# Patient Record
Sex: MALE | Race: WHITE | NOT HISPANIC OR LATINO | Employment: FULL TIME | URBAN - METROPOLITAN AREA
[De-identification: names, ages, dates, MRNs, and addresses within clinical notes are randomized per-mention and may not be internally consistent; named-entity substitution may affect disease eponyms.]

---

## 2023-12-11 ENCOUNTER — APPOINTMENT (OUTPATIENT)
Dept: PRIMARY CARE | Facility: CLINIC | Age: 38
End: 2023-12-11
Payer: COMMERCIAL

## 2023-12-14 ENCOUNTER — APPOINTMENT (OUTPATIENT)
Dept: PRIMARY CARE | Facility: CLINIC | Age: 38
End: 2023-12-14
Payer: COMMERCIAL

## 2023-12-20 ENCOUNTER — APPOINTMENT (OUTPATIENT)
Dept: PRIMARY CARE | Facility: CLINIC | Age: 38
End: 2023-12-20
Payer: COMMERCIAL

## 2024-01-05 ENCOUNTER — OFFICE VISIT (OUTPATIENT)
Dept: PRIMARY CARE | Facility: CLINIC | Age: 39
End: 2024-01-05
Payer: COMMERCIAL

## 2024-01-05 ENCOUNTER — ANCILLARY PROCEDURE (OUTPATIENT)
Dept: RADIOLOGY | Facility: CLINIC | Age: 39
End: 2024-01-05
Payer: COMMERCIAL

## 2024-01-05 VITALS
HEART RATE: 77 BPM | SYSTOLIC BLOOD PRESSURE: 131 MMHG | OXYGEN SATURATION: 98 % | HEIGHT: 72 IN | BODY MASS INDEX: 32.37 KG/M2 | RESPIRATION RATE: 18 BRPM | WEIGHT: 239 LBS | TEMPERATURE: 97.6 F | DIASTOLIC BLOOD PRESSURE: 87 MMHG

## 2024-01-05 DIAGNOSIS — Z23 NEED FOR VACCINATION: ICD-10-CM

## 2024-01-05 DIAGNOSIS — Z13.220 LIPID SCREENING: ICD-10-CM

## 2024-01-05 DIAGNOSIS — Z13.0 SCREENING FOR DEFICIENCY ANEMIA: ICD-10-CM

## 2024-01-05 DIAGNOSIS — L30.8 ANNULAR DERMATITIS: ICD-10-CM

## 2024-01-05 DIAGNOSIS — E55.9 VITAMIN D DEFICIENCY: ICD-10-CM

## 2024-01-05 DIAGNOSIS — M95.4 ACQUIRED PECTUS CARINATUM: Primary | ICD-10-CM

## 2024-01-05 DIAGNOSIS — E53.8 B12 DEFICIENCY: ICD-10-CM

## 2024-01-05 DIAGNOSIS — I10 HYPERTENSION, UNSPECIFIED TYPE: ICD-10-CM

## 2024-01-05 DIAGNOSIS — M95.4 ACQUIRED PECTUS CARINATUM: ICD-10-CM

## 2024-01-05 DIAGNOSIS — S91.209A AVULSION OF TOENAIL, INITIAL ENCOUNTER: ICD-10-CM

## 2024-01-05 DIAGNOSIS — Z13.29 THYROID DISORDER SCREEN: ICD-10-CM

## 2024-01-05 PROBLEM — M51.369 DEGENERATION OF INTERVERTEBRAL DISC OF LUMBAR REGION: Status: ACTIVE | Noted: 2024-01-05

## 2024-01-05 PROBLEM — M51.27 HERNIATED NUCLEUS PULPOSUS OF LUMBOSACRAL REGION: Status: ACTIVE | Noted: 2024-01-05

## 2024-01-05 PROBLEM — M47.816 LUMBAR SPONDYLOSIS: Status: ACTIVE | Noted: 2024-01-05

## 2024-01-05 PROBLEM — M54.50 LOWER BACK PAIN: Status: ACTIVE | Noted: 2024-01-05

## 2024-01-05 PROBLEM — M51.36 DEGENERATION OF INTERVERTEBRAL DISC OF LUMBAR REGION: Status: ACTIVE | Noted: 2024-01-05

## 2024-01-05 PROBLEM — M54.16 LUMBAR RADICULOPATHY: Status: ACTIVE | Noted: 2024-01-05

## 2024-01-05 PROBLEM — M48.07 SPINAL STENOSIS OF LUMBOSACRAL REGION: Status: ACTIVE | Noted: 2024-01-05

## 2024-01-05 PROCEDURE — 71046 X-RAY EXAM CHEST 2 VIEWS: CPT | Performed by: RADIOLOGY

## 2024-01-05 PROCEDURE — 3074F SYST BP LT 130 MM HG: CPT | Performed by: NURSE PRACTITIONER

## 2024-01-05 PROCEDURE — 99204 OFFICE O/P NEW MOD 45 MIN: CPT | Performed by: NURSE PRACTITIONER

## 2024-01-05 PROCEDURE — 3079F DIAST BP 80-89 MM HG: CPT | Performed by: NURSE PRACTITIONER

## 2024-01-05 PROCEDURE — 71046 X-RAY EXAM CHEST 2 VIEWS: CPT

## 2024-01-05 PROCEDURE — 90471 IMMUNIZATION ADMIN: CPT | Performed by: NURSE PRACTITIONER

## 2024-01-05 PROCEDURE — 90686 IIV4 VACC NO PRSV 0.5 ML IM: CPT | Performed by: NURSE PRACTITIONER

## 2024-01-05 ASSESSMENT — ENCOUNTER SYMPTOMS
DIARRHEA: 0
CONSTIPATION: 0
FEVER: 0
PALPITATIONS: 0
SHORTNESS OF BREATH: 0
NERVOUS/ANXIOUS: 0
HEADACHES: 0
CHILLS: 0
NAUSEA: 0
COUGH: 1
CONSTITUTIONAL NEGATIVE: 1
APNEA: 0
WEAKNESS: 0
WHEEZING: 0
ABDOMINAL PAIN: 0
ACTIVITY CHANGE: 0
NUMBNESS: 1
DIFFICULTY URINATING: 0
HEMATURIA: 0
CONFUSION: 0
VOMITING: 0
FATIGUE: 0
DIZZINESS: 0

## 2024-01-05 ASSESSMENT — PAIN SCALES - GENERAL: PAINLEVEL: 8

## 2024-01-05 NOTE — ASSESSMENT & PLAN NOTE
Slightly elevated   Check chem panel   Follow up 2 weeks   Check daily /call for consistently elevated bp >140/90

## 2024-01-05 NOTE — PROGRESS NOTES
Subjective   Patient ID: Isaías Bowser is a 38 y.o. male who presents for New Patient Visit, Arm Pain (Repost sharp pain in left elbow for the past few months ), Rash (On left leg, present for a few months ), and Numbness (Reports numbness in right great toe past few months).    New patient visit to establish care.  Patient with no major past medical history.  No medications  Complaining of left lower extremity rash.  Rash has improved but has noticed since 8/2023.  He describes it as a circular appearing rash with a center dot that was red.  Denies body aches, fatigue.  No awareness of a tick bite  Right big toe numbness and tingling. Dropped a bar on it awhile back, nail fell off and grew back. No pain, no swelling.   Patient reports that his sternum area seems enlarged.  Only has noticed in the past year.  No pain    Arm Pain   Associated symptoms include numbness. Pertinent negatives include no chest pain.   Rash  Associated symptoms include coughing. Pertinent negatives include no diarrhea, fatigue, fever, shortness of breath or vomiting.   Neuropathy           Review of Systems   Constitutional: Negative.  Negative for activity change, chills, fatigue and fever.   Respiratory:  Positive for cough. Negative for apnea, shortness of breath and wheezing.    Cardiovascular:  Negative for chest pain and palpitations.   Gastrointestinal:  Negative for abdominal pain, constipation, diarrhea, nausea and vomiting.   Genitourinary:  Negative for difficulty urinating, hematuria and urgency.   Skin:  Positive for rash.        Left leg  but improved   8/2023   Itching. Circular with dot in center.    Neurological:  Positive for numbness. Negative for dizziness, weakness and headaches.        Right big toe   No swelling or pain   Psychiatric/Behavioral:  Negative for confusion, self-injury and suicidal ideas. The patient is not nervous/anxious.        Objective   /87   Pulse 77   Temp 36.4 °C (97.6 °F) (Temporal)    Resp 18   Ht 1.829 m (6')   Wt 108 kg (239 lb)   SpO2 98%   BMI 32.41 kg/m²     Physical Exam  Vitals reviewed.   Constitutional:       Appearance: Normal appearance.   HENT:      Head: Normocephalic.      Right Ear: Tympanic membrane normal.      Left Ear: Tympanic membrane normal.      Nose: Nose normal.   Eyes:      Pupils: Pupils are equal, round, and reactive to light.   Cardiovascular:      Rate and Rhythm: Normal rate and regular rhythm.      Pulses: Normal pulses.      Heart sounds: Normal heart sounds.   Pulmonary:      Effort: Pulmonary effort is normal.      Breath sounds: Normal breath sounds.   Abdominal:      General: Bowel sounds are normal.   Musculoskeletal:         General: Normal range of motion.   Neurological:      Mental Status: He is alert and oriented to person, place, and time.   Psychiatric:         Mood and Affect: Mood normal.         Behavior: Behavior normal.         Assessment/Plan   Problem List Items Addressed This Visit             ICD-10-CM    Acquired pectus carinatum - Primary M95.4     Chest x-ray for eval         Relevant Orders    XR chest 2 views    Need for vaccination Z23     Influenza vaccination today         Relevant Orders    Flu vaccine (IIV4) age 6 months and greater, preservative free (Completed)    Toenail avulsion S91.209A    Relevant Orders    Referral to Podiatry    Hypertension I10     Slightly elevated   Check chem panel   Follow up 2 weeks   Check daily /call for consistently elevated bp >140/90            Relevant Orders    Comprehensive Metabolic Panel    TSH with reflex to Free T4 if abnormal    Screening for deficiency anemia Z13.0    Relevant Orders    CBC    Iron and TIBC    Ferritin    Annular dermatitis L30.8     Lyme panel   No known tick bite  No n/v, fatigue, body ache.          Relevant Orders    Lyme disease, PCR     Other Visit Diagnoses         Codes    B12 deficiency     E53.8    Relevant Orders    Vitamin B12    Vitamin D deficiency      E55.9    Relevant Orders    Vitamin D 1,25 Dihydroxy (for eval of hypercalcemia)    Lipid screening     Z13.220    Relevant Orders    Lipid Panel    Thyroid disorder screen     Z13.29    Relevant Orders    TSH with reflex to Free T4 if abnormal            Time Spent  Time spent directly with patient, family or caregiver: 20 minutes  Documentation Time: 10 minutes

## 2024-01-06 ENCOUNTER — LAB (OUTPATIENT)
Dept: LAB | Facility: LAB | Age: 39
End: 2024-01-06
Payer: COMMERCIAL

## 2024-01-06 DIAGNOSIS — E55.9 VITAMIN D DEFICIENCY: ICD-10-CM

## 2024-01-06 DIAGNOSIS — Z13.220 LIPID SCREENING: ICD-10-CM

## 2024-01-06 DIAGNOSIS — Z13.0 SCREENING FOR DEFICIENCY ANEMIA: ICD-10-CM

## 2024-01-06 DIAGNOSIS — E53.8 B12 DEFICIENCY: ICD-10-CM

## 2024-01-06 DIAGNOSIS — Z13.29 THYROID DISORDER SCREEN: ICD-10-CM

## 2024-01-06 DIAGNOSIS — L30.8 ANNULAR DERMATITIS: ICD-10-CM

## 2024-01-06 DIAGNOSIS — I10 HYPERTENSION, UNSPECIFIED TYPE: ICD-10-CM

## 2024-01-06 LAB
ALBUMIN SERPL BCP-MCNC: 4.5 G/DL (ref 3.4–5)
ALP SERPL-CCNC: 73 U/L (ref 33–120)
ALT SERPL W P-5'-P-CCNC: 17 U/L (ref 10–52)
ANION GAP SERPL CALC-SCNC: 10 MMOL/L (ref 10–20)
AST SERPL W P-5'-P-CCNC: 17 U/L (ref 9–39)
BILIRUB SERPL-MCNC: 0.8 MG/DL (ref 0–1.2)
BUN SERPL-MCNC: 13 MG/DL (ref 6–23)
CALCIUM SERPL-MCNC: 9.2 MG/DL (ref 8.6–10.3)
CHLORIDE SERPL-SCNC: 105 MMOL/L (ref 98–107)
CHOLEST SERPL-MCNC: 139 MG/DL (ref 0–199)
CHOLESTEROL/HDL RATIO: 3.6
CO2 SERPL-SCNC: 29 MMOL/L (ref 21–32)
CREAT SERPL-MCNC: 0.91 MG/DL (ref 0.5–1.3)
ERYTHROCYTE [DISTWIDTH] IN BLOOD BY AUTOMATED COUNT: 12.7 % (ref 11.5–14.5)
FERRITIN SERPL-MCNC: 161 NG/ML (ref 20–300)
GFR SERPL CREATININE-BSD FRML MDRD: >90 ML/MIN/1.73M*2
GLUCOSE SERPL-MCNC: 92 MG/DL (ref 74–99)
HCT VFR BLD AUTO: 48.1 % (ref 41–52)
HDLC SERPL-MCNC: 38.9 MG/DL
HGB BLD-MCNC: 15.8 G/DL (ref 13.5–17.5)
IRON SATN MFR SERPL: 13 % (ref 25–45)
IRON SERPL-MCNC: 43 UG/DL (ref 35–150)
LDLC SERPL CALC-MCNC: 83 MG/DL
MCH RBC QN AUTO: 31.3 PG (ref 26–34)
MCHC RBC AUTO-ENTMCNC: 32.8 G/DL (ref 32–36)
MCV RBC AUTO: 95 FL (ref 80–100)
NON HDL CHOLESTEROL: 100 MG/DL (ref 0–149)
NRBC BLD-RTO: 0 /100 WBCS (ref 0–0)
PLATELET # BLD AUTO: 310 X10*3/UL (ref 150–450)
POTASSIUM SERPL-SCNC: 4.8 MMOL/L (ref 3.5–5.3)
PROT SERPL-MCNC: 7.1 G/DL (ref 6.4–8.2)
RBC # BLD AUTO: 5.04 X10*6/UL (ref 4.5–5.9)
SODIUM SERPL-SCNC: 139 MMOL/L (ref 136–145)
TIBC SERPL-MCNC: 328 UG/DL (ref 240–445)
TRIGL SERPL-MCNC: 87 MG/DL (ref 0–149)
TSH SERPL-ACNC: 0.86 MIU/L (ref 0.44–3.98)
UIBC SERPL-MCNC: 285 UG/DL (ref 110–370)
VIT B12 SERPL-MCNC: 720 PG/ML (ref 211–911)
VLDL: 17 MG/DL (ref 0–40)
WBC # BLD AUTO: 9.9 X10*3/UL (ref 4.4–11.3)

## 2024-01-06 PROCEDURE — 85027 COMPLETE CBC AUTOMATED: CPT

## 2024-01-06 PROCEDURE — 80053 COMPREHEN METABOLIC PANEL: CPT

## 2024-01-06 PROCEDURE — 84443 ASSAY THYROID STIM HORMONE: CPT

## 2024-01-06 PROCEDURE — 82652 VIT D 1 25-DIHYDROXY: CPT

## 2024-01-06 PROCEDURE — 36415 COLL VENOUS BLD VENIPUNCTURE: CPT

## 2024-01-06 PROCEDURE — 80061 LIPID PANEL: CPT

## 2024-01-06 PROCEDURE — 83550 IRON BINDING TEST: CPT

## 2024-01-06 PROCEDURE — 82607 VITAMIN B-12: CPT

## 2024-01-06 PROCEDURE — 82728 ASSAY OF FERRITIN: CPT

## 2024-01-06 PROCEDURE — 87476 LYME DIS DNA AMP PROBE: CPT

## 2024-01-06 PROCEDURE — 83540 ASSAY OF IRON: CPT

## 2024-01-09 LAB — 1,25(OH)2D3 SERPL-MCNC: 41.6 PG/ML (ref 19.9–79.3)

## 2024-01-10 LAB
B BURGDOR DNA SPEC QL NAA+PROBE: NOT DETECTED
SPECIMEN SOURCE: NORMAL

## 2024-01-11 ENCOUNTER — TELEPHONE (OUTPATIENT)
Dept: PRIMARY CARE | Facility: CLINIC | Age: 39
End: 2024-01-11
Payer: COMMERCIAL

## 2024-01-11 DIAGNOSIS — F17.200 SMOKER: Primary | ICD-10-CM

## 2024-01-11 RX ORDER — NICOTINE 21-14-7MG
KIT TRANSDERMAL
Qty: 1 KIT | Refills: 0 | Status: SHIPPED | OUTPATIENT
Start: 2024-01-11

## 2024-01-11 NOTE — TELEPHONE ENCOUNTER
Patient called in regarding his previous visit. He said you two had discussed putting him on nicotine patches to assist in quitting smoking. I am not seeing that in the visit notes, but he is requesting to be put on these and have them sent to his preferred pharmacy. Please advise.

## 2024-01-11 NOTE — TELEPHONE ENCOUNTER
Ordered nicotine patch kit.  Can you please call the pharmacy and check with them to make sure this kit is ordered correctly.  I have never ordered the kit before and usually order this 21, 14 and 7 mg separately.  I did not want patient to have any issues once they arrive to pick this up

## 2024-01-19 ENCOUNTER — OFFICE VISIT (OUTPATIENT)
Dept: PRIMARY CARE | Facility: CLINIC | Age: 39
End: 2024-01-19
Payer: COMMERCIAL

## 2024-01-19 ENCOUNTER — TELEPHONE (OUTPATIENT)
Dept: PRIMARY CARE | Facility: CLINIC | Age: 39
End: 2024-01-19

## 2024-01-19 VITALS
WEIGHT: 235.4 LBS | BODY MASS INDEX: 31.89 KG/M2 | HEART RATE: 80 BPM | RESPIRATION RATE: 18 BRPM | DIASTOLIC BLOOD PRESSURE: 83 MMHG | SYSTOLIC BLOOD PRESSURE: 127 MMHG | OXYGEN SATURATION: 97 % | TEMPERATURE: 97.5 F | HEIGHT: 72 IN

## 2024-01-19 DIAGNOSIS — R53.83 FATIGUE, UNSPECIFIED TYPE: ICD-10-CM

## 2024-01-19 DIAGNOSIS — L30.8 ANNULAR DERMATITIS: ICD-10-CM

## 2024-01-19 DIAGNOSIS — I10 HYPERTENSION, UNSPECIFIED TYPE: ICD-10-CM

## 2024-01-19 DIAGNOSIS — R06.83 SNORING: Primary | ICD-10-CM

## 2024-01-19 PROCEDURE — 3074F SYST BP LT 130 MM HG: CPT | Performed by: NURSE PRACTITIONER

## 2024-01-19 PROCEDURE — 99214 OFFICE O/P EST MOD 30 MIN: CPT | Performed by: NURSE PRACTITIONER

## 2024-01-19 PROCEDURE — 3079F DIAST BP 80-89 MM HG: CPT | Performed by: NURSE PRACTITIONER

## 2024-01-19 RX ORDER — PHENYLEPHRINE HCL 1 %
500 DROPS NASAL EVERY 6 HOURS PRN
COMMUNITY
Start: 2024-01-11

## 2024-01-19 RX ORDER — CHLORHEXIDINE GLUCONATE ORAL RINSE 1.2 MG/ML
SOLUTION DENTAL
COMMUNITY
Start: 2024-01-11

## 2024-01-19 RX ORDER — IBUPROFEN 600 MG/1
600 TABLET ORAL EVERY 6 HOURS PRN
COMMUNITY
Start: 2024-01-11

## 2024-01-19 ASSESSMENT — ENCOUNTER SYMPTOMS
FATIGUE: 1
FEVER: 0
ABDOMINAL PAIN: 0
CONFUSION: 0
NAUSEA: 0
PALPITATIONS: 0
RESPIRATORY NEGATIVE: 1
WEAKNESS: 0
ACTIVITY CHANGE: 0
CHILLS: 0
VOMITING: 0
HEADACHES: 0
COUGH: 0
NERVOUS/ANXIOUS: 0
DIZZINESS: 0
APNEA: 0
SHORTNESS OF BREATH: 0

## 2024-01-19 ASSESSMENT — PATIENT HEALTH QUESTIONNAIRE - PHQ9
1. LITTLE INTEREST OR PLEASURE IN DOING THINGS: NOT AT ALL
SUM OF ALL RESPONSES TO PHQ9 QUESTIONS 1 AND 2: 0
2. FEELING DOWN, DEPRESSED OR HOPELESS: NOT AT ALL

## 2024-01-19 NOTE — ASSESSMENT & PLAN NOTE
Differential of MICHELLE   Vitamin b12, vitamin d and total iron normal   Slightly low % saturation-consider mv with iron

## 2024-01-19 NOTE — ASSESSMENT & PLAN NOTE
Normal today   Continue to monitor at home   .Discussed DASH diet and dietary sodium restrictions  Continue/Increase dietary efforts and physical activity

## 2024-01-19 NOTE — PROGRESS NOTES
Subjective   Patient ID: Isaías Bowser is a 38 y.o. male who presents for Annual Exam (2 week follow up labs ), Hypertension, Snoring, and Fatigue.    Fatigue: reports daily fatigue. Sleeps from 2463-1623 pm and wakes daily at 4:30-5am. Sleeps well but snores.   Slightly elevated bp at last visit. Normal today.   No reports of headaches.       -CBC:   Collection Time: 01/06/24 11:17 AM       Result                      Value             Ref Range           WBC                         9.9               4.4 - 11.3 x*       nRBC                        0.0               0.0 - 0.0 /1*       RBC                         5.04              4.50 - 5.90 *       Hemoglobin                  15.8              13.5 - 17.5 *       Hematocrit                  48.1              41.0 - 52.0 %       MCV                         95                80 - 100 fL         MCH                         31.3              26.0 - 34.0 *       MCHC                        32.8              32.0 - 36.0 *       RDW                         12.7              11.5 - 14.5 %       Platelets                   310               150 - 450 x1*  -Comprehensive Metabolic Panel:   Collection Time: 01/06/24 11:17 AM       Result                      Value             Ref Range           Glucose                     92                74 - 99 mg/dL       Sodium                      139               136 - 145 mm*       Potassium                   4.8               3.5 - 5.3 mm*       Chloride                    105               98 - 107 mmo*       Bicarbonate                 29                21 - 32 mmol*       Anion Gap                   10                10 - 20 mmol*       Urea Nitrogen               13                6 - 23 mg/dL        Creatinine                  0.91              0.50 - 1.30 *       eGFR                        >90               >60 mL/min/1*       Calcium                     9.2               8.6 - 10.3 m*       Albumin                      4.5               3.4 - 5.0 g/*       Alkaline Phosphatase        73                33 - 120 U/L        Total Protein               7.1               6.4 - 8.2 g/*       AST                         17                9 - 39 U/L          Bilirubin, Total            0.8               0.0 - 1.2 mg*       ALT                         17                10 - 52 U/L    -Lipid Panel:   Collection Time: 01/06/24 11:17 AM       Result                      Value             Ref Range           Cholesterol                 139               0 - 199 mg/dL       HDL-Cholesterol             38.9              mg/dL               Cholesterol/HDL Ratio       3.6                                   LDL Calculated              83                <=99 mg/dL          VLDL                        17                0 - 40 mg/dL        Triglycerides               87                0 - 149 mg/dL       Non HDL Cholesterol         100               0 - 149 mg/dL  -TSH with reflex to Free T4 if abnormal:   Collection Time: 01/06/24 11:17 AM       Result                      Value             Ref Range           Thyroid Stimulating Ho*     0.86              0.44 - 3.98 *  -Vitamin B12:   Collection Time: 01/06/24 11:17 AM       Result                      Value             Ref Range           Vitamin B12                 720               211 - 911 pg*  -Vitamin D 1,25 Dihydroxy (for eval of hypercalcemia):   Collection Time: 01/06/24 11:17 AM       Result                      Value             Ref Range           Vit D, 1,25-Dihydroxy       41.6              19.9 - 79.3 *  -Iron and TIBC:   Collection Time: 01/06/24 11:17 AM       Result                      Value             Ref Range           Iron                        43                35 - 150 ug/*       UIBC                        285               110 - 370 ug*       TIBC                        328               240 - 445 ug*       % Saturation                13 (L)            25 - 45 %       -Ferritin:   Collection Time: 01/06/24 11:17 AM       Result                      Value             Ref Range           Ferritin                    161               20 - 300 ng/*  -Lyme disease, PCR:   Collection Time: 01/06/24 11:17 AM       Result                      Value             Ref Range           Lyme Disease (Borrelia*     Not Detected                          Lyme Source                 Blood                                    Review of Systems   Constitutional:  Positive for fatigue. Negative for activity change, chills and fever.   Respiratory: Negative.  Negative for apnea, cough and shortness of breath.    Cardiovascular:  Negative for chest pain and palpitations.   Gastrointestinal:  Negative for abdominal pain, nausea and vomiting.   Neurological:  Negative for dizziness, weakness and headaches.   Psychiatric/Behavioral:  Negative for confusion. The patient is not nervous/anxious.        Objective   /83   Pulse 80   Temp 36.4 °C (97.5 °F) (Temporal)   Resp 18   Ht 1.829 m (6')   Wt 107 kg (235 lb 6.4 oz)   SpO2 97%   BMI 31.93 kg/m²     Physical Exam  Vitals reviewed.   Constitutional:       Appearance: Normal appearance.   Cardiovascular:      Rate and Rhythm: Normal rate and regular rhythm.      Pulses: Normal pulses.      Heart sounds: Normal heart sounds.   Pulmonary:      Effort: Pulmonary effort is normal.      Breath sounds: Normal breath sounds.   Neurological:      Mental Status: He is alert and oriented to person, place, and time.   Psychiatric:         Mood and Affect: Mood normal.         Behavior: Behavior normal.         Assessment/Plan   Problem List Items Addressed This Visit             ICD-10-CM    Hypertension I10     Normal today   Continue to monitor at home   .Discussed DASH diet and dietary sodium restrictions  Continue/Increase dietary efforts and physical activity           Relevant Orders    In-Center Sleep Study (Non-Sleep Provider Only)    Annular  dermatitis L30.8     Lyme disease unremarkable  Improved: Consider Hydrocortisone otc for management         Snoring - Primary R06.83     Sleep study to eval for MICHELLE         Relevant Orders    In-Center Sleep Study (Non-Sleep Provider Only)    Fatigue R53.83     Differential of MICHELLE   Vitamin b12, vitamin d and total iron normal   Slightly low % saturation-consider mv with iron          Relevant Orders    In-Center Sleep Study (Non-Sleep Provider Only)       Time Spent  Time spent directly with patient, family or caregiver: 20 minutes  Documentation Time: 10 minutes

## 2024-02-19 ENCOUNTER — PROCEDURE VISIT (OUTPATIENT)
Dept: SLEEP MEDICINE | Facility: CLINIC | Age: 39
End: 2024-02-19
Payer: COMMERCIAL

## 2024-02-19 DIAGNOSIS — I10 HYPERTENSION, UNSPECIFIED TYPE: ICD-10-CM

## 2024-02-19 DIAGNOSIS — G47.33 OBSTRUCTIVE SLEEP APNEA (ADULT) (PEDIATRIC): ICD-10-CM

## 2024-02-19 DIAGNOSIS — R53.83 FATIGUE, UNSPECIFIED TYPE: ICD-10-CM

## 2024-02-19 DIAGNOSIS — R06.83 SNORING: ICD-10-CM

## 2024-02-19 PROCEDURE — 95810 POLYSOM 6/> YRS 4/> PARAM: CPT | Performed by: STUDENT IN AN ORGANIZED HEALTH CARE EDUCATION/TRAINING PROGRAM

## 2024-02-20 VITALS
WEIGHT: 235.89 LBS | DIASTOLIC BLOOD PRESSURE: 83 MMHG | HEIGHT: 72 IN | SYSTOLIC BLOOD PRESSURE: 127 MMHG | BODY MASS INDEX: 31.95 KG/M2

## 2024-02-20 ASSESSMENT — SLEEP AND FATIGUE QUESTIONNAIRES
SITTING AND TALKING TO SOMEONE: 0
SITTING QUITELY BY YOURSELF AFTER LUNCH: 0
SITTING IN A CLASSROOM AT SCHOOL DURING THE MORNING: 0
SITTING AND WATCHING TV OR A VIDEO: 3
ESS-CHAD TOTAL SCORE: 8
SITTING AND RIDING IN A CAR OR BUS FOR ABOUT HALF AN HOUR: 0
SITTING AND READING: 3
LYING DOWN TO REST OR NAP IN THE AFTERNOON: 2
SITTING AND EATING A MEAL: 0

## 2024-02-20 NOTE — PROGRESS NOTES
[facility] TECH NOTE:     Patient: Isaías Bowser   MRN//AGE: [id number]  1985  38 y.o.   Technologist: Krzysztof GOULD   Room: 3   Service Date: 2024        Sleep Testing Location: [facility]    [place]: 8    TECHNOLOGIST SLEEP STUDY PROCEDURE NOTE:   This sleep study is being conducted according to the policies and procedures outlined by the AAS accreditation standards.  The sleep study procedure and processes involved during this appointment was explained to the patient/patient’s family, questions were answered. The patient/family verbalized understanding.      The patient is a 38 y.o. year old male scheduled for a diagnostic PSG  with montage of:  PSG . he arrived for his appointment.      The study that was ultimately completed was a diagnostic PSG  with montage of:  PSG .    The full study Was completed.  Patient questionnaires completed?: yes     Consents signed? yes    Initial Fall Risk Screening:     Isaías has not fallen in the last 6 months. his did not result in injury. Isaías does not have a fear of falling. He does not need assistance with sitting, standing, or walking. he does not need assistance walking in his home. he does not need assistance in an unfamiliar setting. The patient is notusing an assistive device.     Brief Study observations: 37 y/o male patient presents for a diagnostic sleep study. He arrived to the lab by himself at 8:00 PM. Procedures were explained to the patient and he expressed understanding. He was pleasant and cooperative throughout the procedure. CPAP recall consent form was signed.  Split night protocols were not met due to low AHI.     Deviation to order/protocol and reason: NA      If PAP, which was preferred mask/pressure/mode: NA      Other:None    After the procedure, the patient/family was informed to ensure followup with ordering clinician for testing results.      Technologist: Krzysztof GOULD

## 2024-02-22 ENCOUNTER — TELEPHONE (OUTPATIENT)
Dept: PRIMARY CARE | Facility: CLINIC | Age: 39
End: 2024-02-22
Payer: COMMERCIAL

## 2024-02-22 DIAGNOSIS — G47.33 MILD OBSTRUCTIVE SLEEP APNEA: ICD-10-CM

## 2024-02-22 NOTE — TELEPHONE ENCOUNTER
----- Message from MARILIA Mallory sent at 2/22/2024  5:08 PM EST -----  Pap supply sent to medical service [facility]   Please call patient to make him aware of mild obstructive sleep apnea diagnosis

## 2024-02-22 NOTE — TELEPHONE ENCOUNTER
Called patient and notified him of sleep study results. I told the patient his order will be sent to [facility] and they will contact him. Patient understood.

## 2024-03-22 ENCOUNTER — OFFICE VISIT (OUTPATIENT)
Dept: PRIMARY CARE | Facility: CLINIC | Age: 39
End: 2024-03-22
Payer: COMMERCIAL

## 2024-03-22 VITALS
RESPIRATION RATE: 18 BRPM | BODY MASS INDEX: 31.51 KG/M2 | TEMPERATURE: 98.6 F | HEART RATE: 85 BPM | WEIGHT: 232.6 LBS | SYSTOLIC BLOOD PRESSURE: 128 MMHG | OXYGEN SATURATION: 97 % | HEIGHT: 72 IN | DIASTOLIC BLOOD PRESSURE: 82 MMHG

## 2024-03-22 DIAGNOSIS — Z00.00 WELL ADULT EXAM: Primary | ICD-10-CM

## 2024-03-22 DIAGNOSIS — R53.83 FATIGUE, UNSPECIFIED TYPE: ICD-10-CM

## 2024-03-22 DIAGNOSIS — G47.33 OSA ON CPAP: ICD-10-CM

## 2024-03-22 DIAGNOSIS — S80.812A ABRASION OF LEFT LOWER LEG, INITIAL ENCOUNTER: ICD-10-CM

## 2024-03-22 DIAGNOSIS — Z23 NEED FOR TDAP VACCINATION: ICD-10-CM

## 2024-03-22 PROBLEM — R06.83 SNORING: Status: RESOLVED | Noted: 2024-01-19 | Resolved: 2024-03-22

## 2024-03-22 PROCEDURE — 99396 PREV VISIT EST AGE 40-64: CPT | Performed by: NURSE PRACTITIONER

## 2024-03-22 PROCEDURE — 99213 OFFICE O/P EST LOW 20 MIN: CPT | Performed by: NURSE PRACTITIONER

## 2024-03-22 PROCEDURE — 3074F SYST BP LT 130 MM HG: CPT | Performed by: NURSE PRACTITIONER

## 2024-03-22 PROCEDURE — 3079F DIAST BP 80-89 MM HG: CPT | Performed by: NURSE PRACTITIONER

## 2024-03-22 RX ORDER — HYDROCORTISONE 25 MG/G
CREAM TOPICAL 2 TIMES DAILY PRN
Qty: 30 G | Refills: 2 | Status: SHIPPED | OUTPATIENT
Start: 2024-03-22 | End: 2025-03-22

## 2024-03-22 ASSESSMENT — ENCOUNTER SYMPTOMS
WEAKNESS: 0
FATIGUE: 0
FEVER: 0
CHEST TIGHTNESS: 0
DIFFICULTY URINATING: 0
CONFUSION: 0
FREQUENCY: 0
HEADACHES: 0
NAUSEA: 0
WHEEZING: 0
DIZZINESS: 0
EYE DISCHARGE: 0
CHILLS: 0
AGITATION: 0
ARTHRALGIAS: 0
EYE PAIN: 0
VOICE CHANGE: 0
SLEEP DISTURBANCE: 0
PALPITATIONS: 0
DIARRHEA: 0
HALLUCINATIONS: 0
ABDOMINAL PAIN: 0
HEMATURIA: 0
SORE THROAT: 0

## 2024-03-22 NOTE — ASSESSMENT & PLAN NOTE
Referral to sleep med   30 day compliance and adherence follow up   Supplies ordered/ [employer].   Patient set up for in office equipment

## 2024-03-22 NOTE — ASSESSMENT & PLAN NOTE
Patient reports improved symptoms since change in diet and vitamin B12 supplements.  Will also use CPAP to improve symptoms

## 2024-03-22 NOTE — PROGRESS NOTES
Subjective   Patient ID: Isaías Bowser is a 39 y.o. male who presents for Annual Exam (2 month follow up MICHELLE), Sleep Apnea (Picks up CPAP supplies next Friday ), Abrasion (Reports on lower left leg, notified today ), and Hypertension.    Annual physical completed, reviewed health maintenance tasks, Patient will get Tdap vaccine at pharmacy    Followed up with patient on snoring and fatigue related to MICHELLE. Sleep study was ordered and completed on 2/19/24. Results were called into patient and reviewed during this visit. CPAP was suggested and ordered to improve sleep. Patient will  supplies for CPAP on Friday 3/29/24 from [employer].    Patient reports sleep has improved with iron supplements and change in diet  Patient reports at least 7 hours a night, only wakes 2-3 times a night, to use the restroom or due to dry mouth    Patient reports having all of teeth extracted in December 2023, Will have impressions completed in April, will have full dentures by June.  Patient denies problems swallowing or chewing foods, he is on a soft diet, denies episodes of choking. Uses mouthwash daily and has increased fluids since extraction to help with dry mouth.    Today patient presents with red abrasion on the posterior portion of his lower left leg. Resembles scarring, from scratching, denies pain, swelling, discharge at this time. Reports occasional itching. Patient uses pinxav at home for pruritus      Hypertension  Patient BP diastolic slightly elevated today 131/91, denies Palpitation, Chest Pain, Dizziness, and fainting. Recheck was 125/82    Smoking   Patient was prescribed nicotine patches 1/11/24. Pt reports he stopped using patches 2 weeks, Patient continues to smoke. Stated he is not ready quit yet, will revisit patches as treatment in the future           Review of Systems   Constitutional:  Negative for chills, fatigue and fever.   HENT:  Positive for dental problem. Negative for congestion,  drooling, ear pain, hearing loss, postnasal drip, sore throat and voice change.         All teeth removed will have have dentist in in June 4/16 next appointment for impressions   Eyes:  Negative for pain, discharge and visual disturbance.   Respiratory:  Negative for chest tightness and wheezing.    Cardiovascular:  Negative for chest pain, palpitations and leg swelling.   Gastrointestinal:  Negative for abdominal pain, diarrhea and nausea.   Genitourinary:  Negative for difficulty urinating, frequency, hematuria and urgency.   Musculoskeletal:  Negative for arthralgias and gait problem.   Skin:  Positive for rash.        Posterior portion of left calf   Neurological:  Negative for dizziness, weakness and headaches.   Psychiatric/Behavioral:  Negative for agitation, confusion, hallucinations, sleep disturbance and suicidal ideas.        Objective   /82 (BP Location: Right arm, Patient Position: Sitting)   Pulse 85   Temp 37 °C (98.6 °F) (Temporal)   Resp 18   Ht 1.829 m (6')   Wt 106 kg (232 lb 9.6 oz)   SpO2 97%   BMI 31.55 kg/m²     Physical Exam  Vitals reviewed.   HENT:      Head: Normocephalic.      Nose: Nose normal.      Mouth/Throat:      Mouth: Mucous membranes are dry.      Comments: At times, focusing on increasing fluids  Eyes:      Pupils: Pupils are equal, round, and reactive to light.   Cardiovascular:      Rate and Rhythm: Regular rhythm. Tachycardia present.      Pulses: Normal pulses.   Pulmonary:      Effort: Pulmonary effort is normal.   Abdominal:      General: Abdomen is flat. Bowel sounds are normal.   Genitourinary:     Penis: Normal.    Musculoskeletal:         General: Normal range of motion.      Cervical back: Normal range of motion.   Skin:     General: Skin is warm and dry.      Findings: Rash present.      Comments: Left calf   Neurological:      General: No focal deficit present.      Mental Status: He is alert.   Psychiatric:         Mood and Affect: Mood normal.        Health Maintenance Topics with due status: Overdue       Topic Date Due    Hepatitis B Vaccines Never done    HIV Screening Never done    MMR Vaccines Never done    Varicella Vaccines Never done    Pneumococcal Vaccine: Pediatrics (0 to 5 Years) and At-Risk Patients (6 to 64 Years) Never done    DTaP/Tdap/Td Vaccines 10/24/1996    Hepatitis C Screening Never done    Diabetes Screening Never done    COVID-19 Vaccine 09/01/2023     Health Maintenance Topics with due status: Not Due       Topic Last Completion Date    Lipid Panel 01/06/2024    Yearly Adult Physical 03/22/2024    Zoster Vaccines Not Due     Health Maintenance Topics with due status: Completed       Topic Last Completion Date    Influenza Vaccine 01/05/2024     Health Maintenance Topics with due status: Aged Out       Topic Date Due    HIB Vaccines Aged Out    IPV Vaccines Aged Out    Hepatitis A Vaccines Aged Out    Meningococcal Vaccine Aged Out    Rotavirus Vaccines Aged Out    HPV Vaccines Aged Out     Assessment/Plan   Problem List Items Addressed This Visit             ICD-10-CM    Need for Tdap vaccination Z23     Patient is due for Tdap, advised patient vaccine -pharmacy benefit with [employer]         Fatigue R53.83     Patient reports improved symptoms since change in diet and vitamin B12 supplements.  Will also use CPAP to improve symptoms         Well adult exam - Primary Z00.00     Annual exam completed, Reviewed care gaps  Boostrix/ pharmacy benefit         Abrasion of left lower leg S80.812A     Use hydrocortisone cream prn / bid prn   Call for worsening   R/t nail scratch         Relevant Medications    hydrocortisone 2.5 % cream    MICHELLE on CPAP G47.33     Referral to sleep med   30 day compliance and adherence follow up   Supplies ordered/ [employer].   Patient set up for in office equipment          Relevant Orders    Referral to Adult Sleep Medicine       Time Spent  Time spent directly with patient, family or caregiver: 45  minutes  Other Time Spent: 10 minutes

## 2024-06-04 ENCOUNTER — OFFICE VISIT (OUTPATIENT)
Dept: SLEEP MEDICINE | Facility: CLINIC | Age: 39
End: 2024-06-04
Payer: COMMERCIAL

## 2024-06-04 VITALS
TEMPERATURE: 98.2 F | RESPIRATION RATE: 16 BRPM | BODY MASS INDEX: 31.42 KG/M2 | HEIGHT: 72 IN | WEIGHT: 232 LBS | HEART RATE: 85 BPM | DIASTOLIC BLOOD PRESSURE: 84 MMHG | OXYGEN SATURATION: 97 % | SYSTOLIC BLOOD PRESSURE: 123 MMHG

## 2024-06-04 DIAGNOSIS — E66.9 OBESITY (BMI 30-39.9): Primary | ICD-10-CM

## 2024-06-04 DIAGNOSIS — I10 BENIGN ESSENTIAL HYPERTENSION: ICD-10-CM

## 2024-06-04 DIAGNOSIS — G47.33 OSA ON CPAP: ICD-10-CM

## 2024-06-04 DIAGNOSIS — G47.61 PERIODIC LIMB MOVEMENTS OF SLEEP: ICD-10-CM

## 2024-06-04 PROCEDURE — 99214 OFFICE O/P EST MOD 30 MIN: CPT | Performed by: INTERNAL MEDICINE

## 2024-06-04 PROCEDURE — 99204 OFFICE O/P NEW MOD 45 MIN: CPT | Performed by: INTERNAL MEDICINE

## 2024-06-04 PROCEDURE — 3074F SYST BP LT 130 MM HG: CPT | Performed by: INTERNAL MEDICINE

## 2024-06-04 PROCEDURE — 3079F DIAST BP 80-89 MM HG: CPT | Performed by: INTERNAL MEDICINE

## 2024-06-04 ASSESSMENT — SLEEP AND FATIGUE QUESTIONNAIRES
SITING INACTIVE IN A PUBLIC PLACE LIKE A CLASS ROOM OR A MOVIE THEATER: WOULD NEVER DOZE
SLEEP_PROBLEM_NOTICEABLE_TO_OTHERS: NOT AT ALL NOTICEABLE
HOW LIKELY ARE YOU TO NOD OFF OR FALL ASLEEP WHILE LYING DOWN TO REST IN THE AFTERNOON WHEN CIRCUMSTANCES PERMIT: SLIGHT CHANCE OF DOZING
HOW LIKELY ARE YOU TO NOD OFF OR FALL ASLEEP IN A CAR, WHILE STOPPED FOR A FEW MINUTES IN TRAFFIC: WOULD NEVER DOZE
SLEEP_PROBLEM_INTERFERES_DAILY_ACTIVITIES: A LITTLE
HOW LIKELY ARE YOU TO NOD OFF OR FALL ASLEEP WHILE SITTING QUIETLY AFTER LUNCH WITHOUT ALCOHOL: SLIGHT CHANCE OF DOZING
HOW LIKELY ARE YOU TO NOD OFF OR FALL ASLEEP WHILE SITTING AND READING: WOULD NEVER DOZE
SATISFACTION_WITH_CURRENT_SLEEP_PATTERN: SATISFIED
ESS-CHAD TOTAL SCORE: 3
DIFFICULTY_STAYING_ASLEEP: MILD
HOW LIKELY ARE YOU TO NOD OFF OR FALL ASLEEP WHILE WATCHING TV: SLIGHT CHANCE OF DOZING
WORRIED_DISTRESSED_DUE_TO_SLEEP: SOMEWHAT
HOW LIKELY ARE YOU TO NOD OFF OR FALL ASLEEP WHILE SITTING AND TALKING TO SOMEONE: WOULD NEVER DOZE
HOW LIKELY ARE YOU TO NOD OFF OR FALL ASLEEP WHEN YOU ARE A PASSENGER IN A CAR FOR AN HOUR WITHOUT A BREAK: WOULD NEVER DOZE

## 2024-06-04 NOTE — PROGRESS NOTES
[facility] SLEEP MEDICINE CLINIC  NEW CONSULT VISIT NOTE      PCP: MARILIA Mallory  Referred by: Rolanda Hughes APRN-CNP    HISTORY OF PRESENT ILLNESS     Patient ID: Isaías Bowser is a 39 y.o. male who presents to [facility] for a comprehensive sleep medicine evaluation with concerns of sleep apnea on CPAP .    Patient is here alone today.  To review, patient's medical history is notable for obesity (BMI 31), HTN, and MICHELLE on CPAP    Patient was diagnosed with MICHELLE in Feb 2024 by PSG and was started on CPAP since then. Currently on auto-CPAP 4-12 cm H2O with EPR/Flex 1 ramp only and Resmed Airfit P10 nasal pillows mask thru [employer]. Patient has been using machine every night.   Patient denies machine problems, mask leak, air hunger, aerophagia, dry mouth, skin irritation, and nasal congestion.   The following are patient's perceived benefits of PAP: no snoring on PAP, refreshing sleep, decreased daytime sleepiness and/or fatigue, decreased nocturnal awakening, decreased nocturia, better quality of sleep, decreased morning headache, improved nocturnal cough, and improved GERD.    SLEEP STUDY HISTORY (personally reviewed raw data such as interpretation report, data sheet, hypnogram, and titration table if available and applicable)  PSG 2/19/2024: RDI3% 14.2, RDI4% 1.7, SpO2 qing 91%, PLM index 23.5 with 13.5% associated with arousals.     SLEEP-WAKE SCHEDULE  Bedtime:  9-11 PM daily  Subjective sleep latency: 10 minutes  Difficulty falling asleep: No  Number of awakenings:  3-4 times per night to go to bathroom without CPAP. Now, he wakes up once to go to bathroom since he  started using CPAP  Nocturia: yes without PAP, no on PAP  Falls back asleep in 5 minutes  Final wake time:  5 AM to 5:30 AM on workdays, 7-8 AM on off-workdays  Out of bed time:  5 AM to 5:30 AM on workdays, 7-8 AM on off-workdays  Shift work: No  Naps: no  Average sleep duration  (excluding naps): 6 hours     SLEEP ENVIRONMENT  Sleep location: bed  Sleep status:  sleeps with girlfriend  Preferred sleep position: back and side  TV in bedroom: Yes  Room is dark: Yes  Room is quiet: Yes  Room is cool: Yes    SLEEP HABITS  Smokin-3 cigarettes daily. Used 1 pack to 1.5 pack daily for 28 years  ETOH: 12 pack beer once a week  Marijuana: no  Caffeine: 2 cups coffee daily  Sleep aids: no    SLEEP ROS  Night symptoms: POSITIVE for snoring before PAP but not now, witnessed apnea before PAP but now , nasal congestion , mouth breathing, heartburn or sour taste in mouth at night before CPAP but not now, nocturnal cough before CPAP but not now, and nocturia before CPAP but not now and NEGATIVE for wake up gasping and/or choking for air, night sweats during sleep, and waking up with racing heart  Morning symptoms: POSITIVE for unrefreshing sleep before PAP but not now, morning headache before CPAP but not now, and morning dry mouth and NEGATIVE for morning sore throat  Daytime symptoms POSITIVE for excessive daytime sleepiness before PAP but not now and fatigue before PAP but not now and NEGATIVE for trouble remembering things in daytime, trouble staying focused in daytime, irritability in daytime, and drowsy driving  Hypersomnia / narcolepsy symptoms: Patient denies symptoms of a hypersomnolence disorder such as sleep paralysis, sleep-related hallucinations, recurrent sleep attacks, automatic behaviors, and cataplexy.   Parasomnia symptoms: Patient denies symptoms of parasomnia such as sleepwalking, sleeptalking, sleep-eating, acting out dreams, and nightmares.   Movements in sleep: Patient denies problematic movements in sleep such as seizures during sleep, frequent leg kicks / jerks while asleep, and sleep-related bruxism.    RLS screen: Patient denies RLS symptoms.    WEIGHT: fluctuating    Claustrophobia: No    REVIEW OF SYSTEMS     All other systems have been reviewed and are  negative.    ALLERGIES     No Known Allergies    MEDICATIONS     Current Outpatient Medications   Medication Sig Dispense Refill    chlorhexidine (Peridex) 0.12 % solution SWISH 15ML IN MOUTH FOR 30 SECONDS, AND SPIT OUT THREE TIMES DAILY      hydrocortisone 2.5 % cream Apply topically 2 times a day as needed for irritation or rash. 30 g 2    ibuprofen 600 mg tablet Take 1 tablet (600 mg) by mouth every 6 hours if needed.      nicotine 21-14-7 mg/24 hr patch, TD daily, sequential Use as directed on kit 1 kit 0    Pain Reliever, acetaminophen, 500 mg tablet Take 1 tablet (500 mg) by mouth every 6 hours if needed.       No current facility-administered medications for this visit.       PAST HISTORIES     PERTINENT PAST MEDICAL HISTORY: See HPI    PERTINENT PAST SURGICAL HISTORY for Sleep Medicine:  non-contributory    PERTINENT FAMILY HISTORY for Sleep Medicine:  Patient denies family history of sleep apnea.    PERTINENT SOCIAL HISTORY:  He  reports that he has been smoking cigarettes. He started smoking about 29 years ago. He has a 14.7 pack-year smoking history. He has never used smokeless tobacco. He reports current alcohol use of about 6.0 standard drinks of alcohol per week. He reports that he does not use drugs. He currently lives with partner and employed in [employer]     Active Problems, Allergy List, Medication List, and PMH/PSH/FH/Social Hx have been reviewed and reconciled in chart. No significant changes unless documented in the pertinent chart section. Updates made when necessary.     PHYSICAL EXAM     VITAL SIGNS: /84   Pulse 85   Temp 36.8 °C (98.2 °F)   Resp 16   Ht 1.829 m (6')   Wt 105 kg (232 lb)   SpO2 97%   BMI 31.46 kg/m²     NECK CIRCUMFERENCE: 15.5 inches    ESS: 3  BRITTANI: 5    Physical Exam  Constitutional: Awake, not in distress  Head: normocephalic, atraumatic  Craniofacial: no retrognathia  Sinus: no tenderness to palpation  Nose: no nasal congestion  Dental: edentulous  Palate:  Narrow  Oropharynx: Quintanilla tongue position 3, Mallampati 2, lateral wall narrowing grade 3   Tonsils: +2  Uvula: midline on phonation, elongated and swollen  Tongue: Midline on protrusion, no  Tongue scalloping, no macroglossia  Lungs: Clear to auscultation bilateral, no rales  Heart: Regular rate and rhythm, no murmurs  Skin: Warm, no rash  Neuro: No tremors, moves all extremities  Psych: alert and oriented to time, place, and person    RESULTS/DATA     Iron (ug/dL)   Date Value   01/06/2024 43     % Saturation (%)   Date Value   01/06/2024 13 (L)     TIBC (ug/dL)   Date Value   01/06/2024 328     Ferritin (ng/mL)   Date Value   01/06/2024 161       Bicarbonate   Date Value Ref Range Status   01/06/2024 29 21 - 32 mmol/L Final     PAP Adherence  A PAP adherence data was obtained and reviewed personally today in clinic. (see scanned document in EPIC)        ASSESSMENT/PLAN     Isaías Bowser is a 39 y.o. male who is seen today in [facility] for the following problems:.     OBSTRUCTIVE SLEEP APNEA, MILD (PSG RDI3% 14.2, RDI4% 1.7)  - Now on auto-CPAP 4-12 cm H2O and EPR 1 ramp only  - Doing well with improved MICHELLE symptoms.   - PAP adherence data reviewed today. Usage days 28/30, days> 4 hours at 77%, residual AHI 0.9.   - Continue current machine settings. Continue using machine every night all night.   - Discussed sleep apnea in detail to include pathophysiology, risk factors, diagnostic options, treatment options, and cardiovascular / neurologic consequences of untreated MICHELLE.   - Supportive management as follows: Lose weight. Stay off your back when sleeping. Avoid smoking, alcohol, and sedating medications if applicable. Don't drive when sleepy.    PERIODIC LIMB MOVEMENTS of SLEEP  - PSG PLM index 23.5 with 13.5% associated with arousals  - Patient denies RLS symptoms but reports neuropathy  - Recommend checking for ferritin and TSAT. Per patient, PCP is monitoring his  iron    OBESITY   - Recommend weight loss with diet and exercise.  - Weight loss can help in long term management of MICHELLE.  - Defer management to PCP.    HYPERTENSION  - BP stable today.  - Continue anti-hypertensive medications per PCP.  - Supportive management: low salt DASH diet (less than 2000 mg sodium intake daily), moderate intensity aerobic exercise at least 30 minutes 5 days per week, reduce stress, quit smoking, limit alcohol, lose weight, and monitor BP once daily  - PCP following. Defer management to PCP.        Follow-up in 1 year.    All of patient's questions were answered. He verbalizes understanding and agreement with my assessment and plan. Refer to after-visit-summary (AVS) for patient education and more details on sleep study preparation, troubleshooting issues with PAP usage, proper cleaning instructions of PAP supplies, and usual recommended replacement schedule for each of the PAP supplies.

## 2024-07-02 NOTE — PATIENT INSTRUCTIONS
"Thank you for coming to the [facility] Medicine [facility] today! Your sleep medicine provider today was: Helen Nam MD Below is a summary of your treatment plan, patient education, other important information, and our contact numbers.      TREATMENT PLAN     - Continue current machine settings. Continue using machine every night all night. Order placed to renew PAP supplies.   - Please read the \"Patient Education\" section below for more detailed information. Try implementing tips, reminders, strategies, and supportive management.   - If not yet done, please sign up for My Own [facility] to make a future schedule, send prescription requests, or send messages.    Follow-up Appointment:   Follow-up in 1 year.    PATIENT EDUCATION     OBSTRUCTIVE SLEEP APNEA (MICHELLE) is a sleep disorder where your upper airway muscles relax during sleep and the airway intermittently and repetitively narrows and collapses leading to partially blocked airway (hypopnea) or completely blocked airway (apnea) which, in turn, can disrupt breathing in sleep, lower oxygen levels while you sleep and cause night time wakings. Because both apnea and hypopnea may cause higher carbon dioxide or low oxygen levels, untreated MICHELLE can lead to heart arrhythmia, elevation of blood pressure, and make it harder for the body to consolidate memory and facilitate metabolism (leading to higher blood sugars at night). Frequent partial arousals occur during sleep resulting in sleep deprivation and daytime sleepiness. MICHELLE is associated with an increased risk of cardiovascular disease, stroke, hypertension, and insulin resistance. Moreover, untreated MICHELLE with excessive daytime sleepiness can increase the risk of motor vehicular accidents.    Below are conservative strategies for MICHELLE regardless of MICHELLE severity are:   Positional therapy - Avoid sleeping on your back.   Healthy diet and regular exercise to optimize weight is highly encouraged.   Avoid alcohol late in the evening and " sedative-hypnotics as these substances can make sleep apnea worse.   Improve breathing through the nose with intranasal steroid spray, saline rinse, or antihistamines    Safety: Avoid driving vehicle and operating heavy equipment while sleepy. Drowsy driving may lead to life-threatening motor vehicle accidents. A person driving while sleepy is 5 times more likely to have an accident. If you feel sleepy, pull over and take a short power nap (sleep for less than 30 minutes). Otherwise, ask somebody to drive you.    Treatment options for sleep apnea include weight management, positional therapy, Positive Airway Therapy (PAP) therapy, oral appliance therapy, hypoglossal nerve stimulator (Inspire) and select airway surgeries.    Annual Reminders About Your Sleep Apnea    Your sleep apnea is well controlled based on reviewing your PAP Data Report.     General Reminders:  Continue current machine settings. Continue using machine every night. Need at least 4 hours daily usage.   Remember to clean your mask, tubings, and water chamber regularly as instructed.   Know the replacement schedule of your supplies/ accessories and contact your DME (durable medical equipment) provider if you are due for them.   Avoid driving or operating heavy machinery when drowsy. A person driving while sleepy is 5 times more likely to have an accident. If you feel sleepy, pull over and take a short power nap (sleep for less than 30 minutes). Otherwise, ask somebody to drive you.    Follow-up sooner through [facility] or calling our office if you have any of the following symptoms:  Snoring or stopping breathing while using the machine  Recurrence of fatigue, sleepiness, insomnia, and other symptoms you had prior using machine  Persistent or worsening fatigue or sleepiness despite regular use of machine  Issues tolerating the machine like bloating sensation, air hunger, too much hot air, too much pressure, taking off mask without recall in the middle  of sleep, etc.     Other conservative measures to improve sleep apnea:  Losing weight can lead to some improvement of MICHELLE which means you will need lower pressures in machine to control your MICHELLE. In some patients, they don't need to use the machine after bariatric surgery. Hence, consider medical and/or surgical weight loss especially if your BMI is more than 35.  Avoiding alcohol, sedative-hypnotics, and sedating medications is imperative as these substances can worsen snoring and sleep apnea  If you have nasal congestion or seasonal allergies, improving your breathing through the nose is critical for treating sleep apnea, tolerating CPAP, and improving your sleep; hence, using intranasal steroid spray like Flonase might help. Make sure you know the proper way to use it.  Stay off your back when sleeping.    Common issues with PAP machine:  Mask gets dislodged when turning to the side: Consider getting a CPAP pillow or switching to a mask with hose on top.     Dry mouth:  Your machine has built-in humidifier that heats up the air to prevent dry mouth. It can be adjusted to your comfort. You can try that first and increase setting one level one night at a time to check which setting is comfortable and effective in lessening dry mouth. In some patients with heated hose, adjusting tube temperature to make air warmer can improve dry mouth. If dry mouth persists despite adjusting humidity or tube temperature setting, may apply OTC Biotene gel over the gums at bedtime.  If Biotene gel is not effective, consider trying XEROSTOM gel from [employer].  Also, eliminate or reduce dose of medications that can cause dry mouth if possible. Lastly, may try getting a separate room humidifier machine.    Airleaks: Please call DME as they may need to adjust your mask or refit you with a different kind or different size of mask. In addition, you can ask DME for tips on getting a good mask seal and mask fit.     Difficulty tolerating  "the mask: Contact your DME to try a different kind of mask and/or call office to get a referral to Sleep Psychologist for CPAP desensitization. CPAP desensitization technique is a set of strategies that helps patient cope with claustrophobia and anxiety related to wearing mask. Alternatively, we can do a daytime mini-sleep study called PAP-nap trial wherein you will try on different kinds of mask and the sleep technician will try different pressure settings on CPAP and BPAP machines to see which specific pressure is tolerable and comfortable for you.     Water droplets or moisture within the hose and/or mask: This is called rain-out and it is caused by condensation of too much heated humidity on the cooler walls of the hose. If you have rain-out, turn down humidity settings or get a heated hose. If you already have a heated hose, turn up the \"tube temperature\" of the heated hose. Alternatively, if you don't want to get a heated hose or warmer air, may wrap the CPAP hose with stockings to keep it somewhat warm. Also, you need to place the machine on the floor and lower the hose so that water won't travel upward towards your mask.     Maintaining your CPAP/BPAP device:    The humidification chamber (aka water tank or water chamber) needs to be filled with distilled water to prevent buildup of white deposits in the future. If you cannot find distilled water, you can use tap water but expect to have white deposits buildup seen after prolonged use with tap water. If you start seeing white deposits on the water chamber, you can clean it by filling it with equal parts of distilled white vinegar and water. Let the vinegar-water mixture sit for 2 hours, and then rinse it with running tap water. Clean with soap and water then let it dry.     You should try to keep your machine clean in order to work well. Here are some tips to clean PAP supplies / accessories:    Clean the humidification chamber (aka water tank) as well as " your mask and tubing at least once a week with soap and water.   Alternatively, you can fill a sink or basin with warm water and add a little mild detergent, like Ivory dish soap. Gently wipe your supplies with the soapy water to free all the oils and dirt that may have collected. Once that's done, rinse these items with clean water until the soap is gone and let them air dry. You can hang your tubing over the curtain lainey in your bathroom so that it dries.  The mask insert (part of the mask that has contact with your skin) needs to be cleaned with soap and water daily. Another option is to wipe them down with CPAP wipes or baby wipes.    You should replace your mask and tubing frequently in order to prevent bacteria buildup, machine damage, and mask seal issues. The older the mask and hose, the high likelihood that there is bacteria buildup in it especially if they are not cleaned regularly. Dirty filters damage machines because build-up of dust and contaminants can cause machine to over-heat, and in time, damage the motor of machine. Cushions lose their seal over time as most masks are made of plastic and silicone while headgear is made of neoprene. These materials will break down with age and frequent use. Here is the recommended replacement schedule for PAP supplies / accessories:    Twice a month- disposable filters and cushions for nasal mask or nasal pillows.  Once a month- cushion for full face mask  Every 3 months- mask with headgear and PAP tubing (standard or heated hose)  Every 6 months- reusable filter, water chamber, and chin strap     Other useful information:    Some people do not put water in the tank while other people prefers to put water in the tank to prevent mouth dryness. Try to experiment to determine which is more comfortable for you.   In general, new machines have 2 years warranty on parts while health insurance allows you to have a new machine once every 5 years.     You can also go to the  following EDUCATION WEBSITES for further information:   American Academy of Sleep Medicine http://sleepeducation.org  National Sleep Foundation: https://sleepfoundation.org  American Sleep Apnea Association: https://www.sleepapnea.org (for patients with sleep apnea)  Narcolepsy Network: https://www.narcolepsynetwork.org (for patients with narcolepsy)  [employer]: https://www.ugichemcolepsy.org (for patients with narcolepsy)  Hypersomnia Foundation: https://www.hypersomniafoundation.org (for patients with idiopathic hypersomnia)  RLS foundation: https://www.rls.org (for patients with restless leg syndrome)    IMPORTANT INFORMATION     Call 911 for medical emergencies.  Our offices are generally open from Monday-Friday, 8 am - 5 pm.   There are no supporting services by either the sleep doctors or their staff on weekends and Holidays, or after 5 PM on weekdays.   If you need to get in touch with me, you may either call my office number or you can use [employer].  If a referral for a test, for CPAP, or for another specialist was made, and you have not heard about scheduling this within a week, please call scheduling at [phone] ([phone]).  If you are unable to make your appointment for clinic or an overnight study, kindly call the office or sleep testing center at least 48 hours in advance to cancel and reschedule.  If you are on CPAP, please bring your device's card and/or the device to each clinic appointment.   In case of problems with PAP machine or mask interface, please contact your [employer] (Durable Medical Equipment) company first. [employer] is the company who provides you the machine and/or PAP supplies.       PRESCRIPTIONS     We require 7 days advanced notice for prescription refills. If we do not receive the request in this time, we cannot guarantee that your medication will be refilled in time.    IMPORTANT PHONE NUMBERS     Sleep Medicine Clinic Fax: [phone]  Appointments (for Pediatric Sleep Clinic):  [phone] - option 1  Appointments (for Adult Sleep Clinic): [phone] - option 2  Appointments (For Sleep Studies): [phone] - option 3  Behavioral Sleep Medicine: [phone]  Sleep Surgery: [phone]  Nutrition Service: [phone]  Weight management clinics with endocrinology: [phone]  Bariatric Services: [phone] (includes weight loss medications and weight loss surgery)  [facility]: [phone] (offers holistic approaches to weight management)  ENT (Otolaryngology): [phone]  Headache Clinic (Neurology): [phone]  Neurology: [phone]  Psychiatry: [phone]  Pulmonary Function Testing (PFT) Center: [phone]  Pulmonary Medicine: [phone]  [facility] [facility]): [phone]      OUR SLEEP TESTING LOCATIONS     Our team will contact you to schedule your sleep study, however, you can contact us as follow:  Main Phone Line (scheduling only): [phone]), option 3  Adult and Pediatric Locations  [facility] (6 years and older): [facility] - 4th floor ([address] [place] [address]) After hours line: [phone]  [facility] (Main campus: All ages): [facility], 6th floor. After hours line: [phone]   [place] (5 years and older; younger considered on case-by-case basis): [address]; [address], [address]. Scheduling  After hours line: [phone]   [place] (6 years and older): [address]; [address]; [address]   [place] (6 years and older): [address] [facility], [address]  After hours line: [phone]   [place] (13 years and older) in [place]: [address] [facility], 2nd floor  After hours line: [phone]   [place] (13 year and older): [address]  After hours line: [phone]     Adult Only Locations:   [place] (18 years and older): [address] [place] [address], 2nd floor   [place] (18  "years and older): [address] [facility]; 4th floor  After hours line: [phone]  [facility] (18 years and older) at [place]: [address] [facility]  After hours line: [phone]     CONTACTING YOUR SLEEP MEDICINE PROVIDER AND SLEEP TEAM      For issues with your machine or mask interface, please call your DME provider first. [employer] for durable medical company. DME is the company who provides you the machine and/or PAP supplies / accessories.   To schedule, cancel, or reschedule SLEEP STUDY APPOINTMENTS, please call the Main Phone Line at [phone] - option 3.   To schedule, cancel, or reschedule CLINIC APPOINTMENTS, you can do it in \"[facility]\", call [phone] (direct line) to speak with my practice lead (Clementina Kimball), or call the Main Phone Line at [phone] - option 2  For CLINICAL QUESTIONS or MEDICATION REFILLS, please call direct line for Adult Sleep Nurses at [phone].   Lastly, you can also send a message directly to your provider through \"My Chart\", which is the email service through your  Records Account: [email or web address]       Here at [facility], we wish you a restful sleep!    "

## 2024-09-20 ENCOUNTER — OFFICE VISIT (OUTPATIENT)
Dept: PRIMARY CARE | Facility: CLINIC | Age: 39
End: 2024-09-20
Payer: COMMERCIAL

## 2024-09-20 ENCOUNTER — APPOINTMENT (OUTPATIENT)
Dept: PRIMARY CARE | Facility: CLINIC | Age: 39
End: 2024-09-20
Payer: COMMERCIAL

## 2024-09-20 VITALS
DIASTOLIC BLOOD PRESSURE: 85 MMHG | OXYGEN SATURATION: 96 % | BODY MASS INDEX: 36.35 KG/M2 | HEART RATE: 79 BPM | TEMPERATURE: 97.8 F | WEIGHT: 268.4 LBS | RESPIRATION RATE: 16 BRPM | HEIGHT: 72 IN | SYSTOLIC BLOOD PRESSURE: 126 MMHG

## 2024-09-20 DIAGNOSIS — R19.7 DIARRHEA, UNSPECIFIED TYPE: Primary | ICD-10-CM

## 2024-09-20 DIAGNOSIS — R10.31 RIGHT LOWER QUADRANT ABDOMINAL PAIN: ICD-10-CM

## 2024-09-20 DIAGNOSIS — G47.33 OSA ON CPAP: ICD-10-CM

## 2024-09-20 PROCEDURE — 3008F BODY MASS INDEX DOCD: CPT | Performed by: NURSE PRACTITIONER

## 2024-09-20 PROCEDURE — 3074F SYST BP LT 130 MM HG: CPT | Performed by: NURSE PRACTITIONER

## 2024-09-20 PROCEDURE — 3079F DIAST BP 80-89 MM HG: CPT | Performed by: NURSE PRACTITIONER

## 2024-09-20 PROCEDURE — 99214 OFFICE O/P EST MOD 30 MIN: CPT | Performed by: NURSE PRACTITIONER

## 2024-09-20 ASSESSMENT — ENCOUNTER SYMPTOMS
NERVOUS/ANXIOUS: 0
DIZZINESS: 0
COUGH: 0
VOMITING: 0
CHILLS: 0
HEADACHES: 0
WEAKNESS: 0
PALPITATIONS: 0
CONSTITUTIONAL NEGATIVE: 1
DIARRHEA: 1
NAUSEA: 0
FEVER: 0
ACTIVITY CHANGE: 0
FATIGUE: 0
ABDOMINAL PAIN: 1
APNEA: 0
SHORTNESS OF BREATH: 0
RESPIRATORY NEGATIVE: 1
CONFUSION: 0

## 2024-09-20 NOTE — ASSESSMENT & PLAN NOTE
Ultrasound of the gallbladder, lipase, amylase and hepatic function panel ordered  Follow-up 4 to 6 weeks for virtual evaluation

## 2024-09-20 NOTE — PROGRESS NOTES
Gabapentin prescription was prescribed by Dr. Pulliam. Please send refill request to Dr. Pulliam.    Subjective   Patient ID: Isaías Bowser is a 39 y.o. male who presents for Abdominal Pain, Diarrhea, and Sleep Apnea.    Abdominal Pain and Loose Stools after eating: Denies nausea, vomiting  Patient denies fever or fatigue  No heartburn symptoms or indigestion  Requesting a screening colonoscopy.  No family history and discussed age of 39- May need diagnostic colonoscopy if loose stools proceed    MICHELLE: Managed by sleep medicine.  On CPAP  Aiding and benefiting from CPAP use           Review of Systems   Constitutional: Negative.  Negative for activity change, chills, fatigue and fever.   Respiratory: Negative.  Negative for apnea, cough and shortness of breath.    Cardiovascular:  Negative for chest pain and palpitations.   Gastrointestinal:  Positive for abdominal pain and diarrhea. Negative for nausea and vomiting.   Neurological:  Negative for dizziness, weakness and headaches.   Psychiatric/Behavioral:  Negative for confusion. The patient is not nervous/anxious.        Objective   /85 (BP Location: Right arm, Patient Position: Sitting, BP Cuff Size: Large adult)   Pulse 79   Temp 36.6 °C (97.8 °F) (Temporal)   Resp 16   Ht 1.829 m (6')   Wt 122 kg (268 lb 6.4 oz)   SpO2 96%   BMI 36.40 kg/m²     Physical Exam  Vitals reviewed.   Constitutional:       Appearance: Normal appearance. He is obese.   Cardiovascular:      Rate and Rhythm: Normal rate and regular rhythm.      Pulses: Normal pulses.      Heart sounds: Normal heart sounds.   Pulmonary:      Effort: Pulmonary effort is normal.      Breath sounds: Normal breath sounds.   Abdominal:      General: Bowel sounds are normal.      Palpations: Abdomen is soft.      Tenderness: There is no abdominal tenderness.       Musculoskeletal:         General: Normal range of motion.   Neurological:      Mental Status: He is alert and oriented to person, place, and time.   Psychiatric:         Mood and Affect: Mood normal.         Behavior: Behavior normal.          Assessment/Plan   Problem List Items Addressed This Visit             ICD-10-CM    MICHELLE on CPAP G47.33     Follow-up with sleep med  Benefiting from CPAP use         Diarrhea - Primary R19.7     Ultrasound of the gallbladder, lipase, amylase and hepatic function panel ordered  Follow-up 4 to 6 weeks for virtual evaluation         Relevant Orders    US gallbladder    Lipase    Amylase    Hepatic function panel    Right lower quadrant abdominal pain R10.31     Ultrasound of the abdomen to rule out cholecystitis  Suspect pain radiates to right lower quadrant  Less likely appendix involvement-no excruciating pain and pain has been intermittent for several weeks         Relevant Orders    US gallbladder

## 2024-09-20 NOTE — ASSESSMENT & PLAN NOTE
Ultrasound of the abdomen to rule out cholecystitis  Suspect pain radiates to right lower quadrant  Less likely appendix involvement-no excruciating pain and pain has been intermittent for several weeks

## 2024-09-27 ENCOUNTER — APPOINTMENT (OUTPATIENT)
Dept: RADIOLOGY | Facility: HOSPITAL | Age: 39
End: 2024-09-27
Payer: COMMERCIAL

## 2024-10-03 ENCOUNTER — APPOINTMENT (OUTPATIENT)
Dept: RADIOLOGY | Facility: HOSPITAL | Age: 39
End: 2024-10-03
Payer: COMMERCIAL

## 2024-10-18 ENCOUNTER — APPOINTMENT (OUTPATIENT)
Dept: PRIMARY CARE | Facility: CLINIC | Age: 39
End: 2024-10-18
Payer: COMMERCIAL

## 2024-11-14 ENCOUNTER — LAB (OUTPATIENT)
Dept: LAB | Facility: LAB | Age: 39
End: 2024-11-14
Payer: COMMERCIAL

## 2024-11-14 ENCOUNTER — HOSPITAL ENCOUNTER (OUTPATIENT)
Dept: RADIOLOGY | Facility: HOSPITAL | Age: 39
Discharge: HOME | End: 2024-11-14
Payer: COMMERCIAL

## 2024-11-14 DIAGNOSIS — R19.7 DIARRHEA, UNSPECIFIED TYPE: ICD-10-CM

## 2024-11-14 DIAGNOSIS — R10.31 RIGHT LOWER QUADRANT ABDOMINAL PAIN: ICD-10-CM

## 2024-11-14 LAB
ALBUMIN SERPL BCP-MCNC: 4.5 G/DL (ref 3.4–5)
ALP SERPL-CCNC: 60 U/L (ref 33–120)
ALT SERPL W P-5'-P-CCNC: 19 U/L (ref 10–52)
AMYLASE SERPL-CCNC: 33 U/L (ref 29–103)
AST SERPL W P-5'-P-CCNC: 18 U/L (ref 9–39)
BILIRUB DIRECT SERPL-MCNC: 0.1 MG/DL (ref 0–0.3)
BILIRUB SERPL-MCNC: 0.6 MG/DL (ref 0–1.2)
LIPASE SERPL-CCNC: 29 U/L (ref 9–82)
PROT SERPL-MCNC: 6.7 G/DL (ref 6.4–8.2)

## 2024-11-14 PROCEDURE — 80076 HEPATIC FUNCTION PANEL: CPT

## 2024-11-14 PROCEDURE — 83690 ASSAY OF LIPASE: CPT

## 2024-11-14 PROCEDURE — 76705 ECHO EXAM OF ABDOMEN: CPT | Performed by: RADIOLOGY

## 2024-11-14 PROCEDURE — 82150 ASSAY OF AMYLASE: CPT

## 2024-11-14 PROCEDURE — 36415 COLL VENOUS BLD VENIPUNCTURE: CPT

## 2024-11-14 PROCEDURE — 76705 ECHO EXAM OF ABDOMEN: CPT

## 2025-01-08 ENCOUNTER — LAB (OUTPATIENT)
Dept: LAB | Facility: LAB | Age: 40
End: 2025-01-08
Payer: COMMERCIAL

## 2025-01-08 DIAGNOSIS — E11.9 DM (DIABETES MELLITUS) (MULTI): ICD-10-CM

## 2025-01-08 DIAGNOSIS — E11.9 DM (DIABETES MELLITUS) (MULTI): Primary | ICD-10-CM

## 2025-01-08 LAB
ALBUMIN SERPL BCP-MCNC: 4.7 G/DL (ref 3.4–5)
ALP SERPL-CCNC: 59 U/L (ref 33–120)
ALT SERPL W P-5'-P-CCNC: 25 U/L (ref 10–52)
ANION GAP SERPL CALC-SCNC: 10 MMOL/L (ref 10–20)
AST SERPL W P-5'-P-CCNC: 17 U/L (ref 9–39)
BASOPHILS # BLD AUTO: 0.09 X10*3/UL (ref 0–0.1)
BASOPHILS NFR BLD AUTO: 1 %
BILIRUB DIRECT SERPL-MCNC: 0.1 MG/DL (ref 0–0.3)
BILIRUB SERPL-MCNC: 1.2 MG/DL (ref 0–1.2)
BUN SERPL-MCNC: 16 MG/DL (ref 6–23)
CALCIUM SERPL-MCNC: 9.6 MG/DL (ref 8.6–10.3)
CHLORIDE SERPL-SCNC: 103 MMOL/L (ref 98–107)
CO2 SERPL-SCNC: 30 MMOL/L (ref 21–32)
CREAT SERPL-MCNC: 0.96 MG/DL (ref 0.5–1.3)
CRP SERPL-MCNC: 0.15 MG/DL
EGFRCR SERPLBLD CKD-EPI 2021: >90 ML/MIN/1.73M*2
EOSINOPHIL # BLD AUTO: 0.25 X10*3/UL (ref 0–0.7)
EOSINOPHIL NFR BLD AUTO: 2.8 %
ERYTHROCYTE [DISTWIDTH] IN BLOOD BY AUTOMATED COUNT: 12.2 % (ref 11.5–14.5)
ERYTHROCYTE [SEDIMENTATION RATE] IN BLOOD BY WESTERGREN METHOD: 5 MM/H (ref 0–15)
GLUCOSE SERPL-MCNC: 103 MG/DL (ref 74–99)
HCT VFR BLD AUTO: 45.5 % (ref 41–52)
HGB BLD-MCNC: 15.2 G/DL (ref 13.5–17.5)
IMM GRANULOCYTES # BLD AUTO: 0.03 X10*3/UL (ref 0–0.7)
IMM GRANULOCYTES NFR BLD AUTO: 0.3 % (ref 0–0.9)
LYMPHOCYTES # BLD AUTO: 2.55 X10*3/UL (ref 1.2–4.8)
LYMPHOCYTES NFR BLD AUTO: 28.8 %
MCH RBC QN AUTO: 31.5 PG (ref 26–34)
MCHC RBC AUTO-ENTMCNC: 33.4 G/DL (ref 32–36)
MCV RBC AUTO: 94 FL (ref 80–100)
MONOCYTES # BLD AUTO: 0.74 X10*3/UL (ref 0.1–1)
MONOCYTES NFR BLD AUTO: 8.4 %
NEUTROPHILS # BLD AUTO: 5.19 X10*3/UL (ref 1.2–7.7)
NEUTROPHILS NFR BLD AUTO: 58.7 %
NRBC BLD-RTO: 0 /100 WBCS (ref 0–0)
PLATELET # BLD AUTO: 268 X10*3/UL (ref 150–450)
POTASSIUM SERPL-SCNC: 4.5 MMOL/L (ref 3.5–5.3)
PROT SERPL-MCNC: 6.8 G/DL (ref 6.4–8.2)
RBC # BLD AUTO: 4.83 X10*6/UL (ref 4.5–5.9)
SODIUM SERPL-SCNC: 138 MMOL/L (ref 136–145)
WBC # BLD AUTO: 8.9 X10*3/UL (ref 4.4–11.3)

## 2025-01-08 PROCEDURE — 82248 BILIRUBIN DIRECT: CPT

## 2025-01-08 PROCEDURE — 86140 C-REACTIVE PROTEIN: CPT

## 2025-01-08 PROCEDURE — 85025 COMPLETE CBC W/AUTO DIFF WBC: CPT

## 2025-01-08 PROCEDURE — 80053 COMPREHEN METABOLIC PANEL: CPT

## 2025-01-08 PROCEDURE — 83036 HEMOGLOBIN GLYCOSYLATED A1C: CPT

## 2025-01-08 PROCEDURE — 85652 RBC SED RATE AUTOMATED: CPT

## 2025-01-09 LAB
EST. AVERAGE GLUCOSE BLD GHB EST-MCNC: 103 MG/DL
HBA1C MFR BLD: 5.2 %

## 2025-01-24 ENCOUNTER — APPOINTMENT (OUTPATIENT)
Dept: PRIMARY CARE | Facility: CLINIC | Age: 40
End: 2025-01-24
Payer: COMMERCIAL

## 2025-06-05 ENCOUNTER — APPOINTMENT (OUTPATIENT)
Dept: SLEEP MEDICINE | Facility: CLINIC | Age: 40
End: 2025-06-05
Payer: COMMERCIAL

## 2025-06-13 ENCOUNTER — APPOINTMENT (OUTPATIENT)
Dept: PRIMARY CARE | Facility: CLINIC | Age: 40
End: 2025-06-13
Payer: COMMERCIAL

## 2025-06-13 VITALS
SYSTOLIC BLOOD PRESSURE: 105 MMHG | BODY MASS INDEX: 39.11 KG/M2 | WEIGHT: 288.4 LBS | TEMPERATURE: 96.4 F | HEART RATE: 65 BPM | DIASTOLIC BLOOD PRESSURE: 70 MMHG | OXYGEN SATURATION: 98 %

## 2025-06-13 DIAGNOSIS — R20.2 PARESTHESIA OF UPPER EXTREMITY: Primary | ICD-10-CM

## 2025-06-13 PROCEDURE — 3078F DIAST BP <80 MM HG: CPT | Performed by: NURSE PRACTITIONER

## 2025-06-13 PROCEDURE — 3074F SYST BP LT 130 MM HG: CPT | Performed by: NURSE PRACTITIONER

## 2025-06-13 PROCEDURE — 99213 OFFICE O/P EST LOW 20 MIN: CPT | Performed by: NURSE PRACTITIONER

## 2025-06-13 ASSESSMENT — ENCOUNTER SYMPTOMS
CHILLS: 0
SHORTNESS OF BREATH: 0
NAUSEA: 0
RESPIRATORY NEGATIVE: 1
FATIGUE: 0
PALPITATIONS: 0
HEADACHES: 0
WEAKNESS: 0
APNEA: 0
CONFUSION: 0
ACTIVITY CHANGE: 0
COUGH: 0
CONSTITUTIONAL NEGATIVE: 1
VOMITING: 0
NUMBNESS: 1
NERVOUS/ANXIOUS: 0
DIZZINESS: 0
FEVER: 0
ABDOMINAL PAIN: 0

## 2025-06-13 NOTE — PROGRESS NOTES
Subjective   Patient ID: Isaías Bowser is a 40 y.o. male who presents for Numbness and Tingling.    Bilateral upper extremities with numbness and tingling   Started new job about 3 yrs. Works a lot with hands / .  Denies neck pain   Numbness and tingling mostly in the neck         Review of Systems   Constitutional: Negative.  Negative for activity change, chills, fatigue and fever.   Respiratory: Negative.  Negative for apnea, cough and shortness of breath.    Cardiovascular:  Negative for chest pain and palpitations.   Gastrointestinal:  Negative for abdominal pain, nausea and vomiting.   Neurological:  Positive for numbness. Negative for dizziness, weakness and headaches.        Tingling of hands   Psychiatric/Behavioral:  Negative for confusion. The patient is not nervous/anxious.        Objective   /70   Pulse 65   Temp 35.8 °C (96.4 °F) (Temporal)   Wt 131 kg (288 lb 6.4 oz)   SpO2 98%   BMI 39.11 kg/m²     Physical Exam  Vitals reviewed.   Constitutional:       Appearance: Normal appearance.   HENT:      Head: Normocephalic.   Cardiovascular:      Rate and Rhythm: Normal rate and regular rhythm.      Pulses: Normal pulses.      Heart sounds: Normal heart sounds.   Pulmonary:      Effort: Pulmonary effort is normal. No respiratory distress.      Breath sounds: Normal breath sounds. No wheezing.   Abdominal:      General: Bowel sounds are normal.   Neurological:      General: No focal deficit present.      Mental Status: He is alert and oriented to person, place, and time.   Psychiatric:         Mood and Affect: Mood normal.         Behavior: Behavior normal.         Assessment/Plan   Problem List Items Addressed This Visit           ICD-10-CM    Paresthesia of upper extremity - Primary R20.2    Bilateral upper extremities  Suspect related to         Relevant Medications    lidocaine HCl (bulk)    Other Relevant Orders    EMG & nerve conduction

## 2025-09-19 ENCOUNTER — APPOINTMENT (OUTPATIENT)
Dept: PRIMARY CARE | Facility: CLINIC | Age: 40
End: 2025-09-19
Payer: COMMERCIAL